# Patient Record
Sex: MALE | Race: OTHER | HISPANIC OR LATINO | Employment: FULL TIME | URBAN - METROPOLITAN AREA
[De-identification: names, ages, dates, MRNs, and addresses within clinical notes are randomized per-mention and may not be internally consistent; named-entity substitution may affect disease eponyms.]

---

## 2022-05-20 ENCOUNTER — HOSPITAL ENCOUNTER (EMERGENCY)
Facility: HOSPITAL | Age: 32
Discharge: HOME | End: 2022-05-21
Attending: EMERGENCY MEDICINE

## 2022-05-20 ENCOUNTER — APPOINTMENT (EMERGENCY)
Dept: RADIOLOGY | Facility: HOSPITAL | Age: 32
End: 2022-05-20

## 2022-05-20 ENCOUNTER — APPOINTMENT (EMERGENCY)
Dept: CT IMAGING | Facility: HOSPITAL | Age: 32
End: 2022-05-20

## 2022-05-20 DIAGNOSIS — S93.335A: Primary | ICD-10-CM

## 2022-05-20 PROCEDURE — 99285 EMERGENCY DEPT VISIT HI MDM: CPT | Performed by: EMERGENCY MEDICINE

## 2022-05-20 PROCEDURE — 99152 MOD SED SAME PHYS/QHP 5/>YRS: CPT | Performed by: EMERGENCY MEDICINE

## 2022-05-20 PROCEDURE — 96376 TX/PRO/DX INJ SAME DRUG ADON: CPT

## 2022-05-20 PROCEDURE — 96374 THER/PROPH/DIAG INJ IV PUSH: CPT

## 2022-05-20 PROCEDURE — 99285 EMERGENCY DEPT VISIT HI MDM: CPT

## 2022-05-20 PROCEDURE — 73590 X-RAY EXAM OF LOWER LEG: CPT

## 2022-05-20 PROCEDURE — 73700 CT LOWER EXTREMITY W/O DYE: CPT

## 2022-05-20 PROCEDURE — 73560 X-RAY EXAM OF KNEE 1 OR 2: CPT

## 2022-05-20 PROCEDURE — 73600 X-RAY EXAM OF ANKLE: CPT

## 2022-05-20 RX ORDER — GINSENG 100 MG
1 CAPSULE ORAL ONCE
Status: COMPLETED | OUTPATIENT
Start: 2022-05-20 | End: 2022-05-20

## 2022-05-20 RX ORDER — FENTANYL CITRATE 50 UG/ML
2 INJECTION, SOLUTION INTRAMUSCULAR; INTRAVENOUS ONCE
Status: COMPLETED | OUTPATIENT
Start: 2022-05-20 | End: 2022-05-20

## 2022-05-20 RX ORDER — PROPOFOL 10 MG/ML
1 INJECTION, EMULSION INTRAVENOUS ONCE
Status: COMPLETED | OUTPATIENT
Start: 2022-05-20 | End: 2022-05-21

## 2022-05-20 RX ORDER — HYDROMORPHONE HCL/PF 1 MG/ML
1 SYRINGE (ML) INJECTION ONCE
Status: COMPLETED | OUTPATIENT
Start: 2022-05-20 | End: 2022-05-20

## 2022-05-20 RX ADMIN — BACITRACIN ZINC 1 LARGE APPLICATION: 500 OINTMENT TOPICAL at 23:50

## 2022-05-20 RX ADMIN — HYDROMORPHONE HYDROCHLORIDE 1 MG: 1 INJECTION, SOLUTION INTRAMUSCULAR; INTRAVENOUS; SUBCUTANEOUS at 21:44

## 2022-05-20 RX ADMIN — HYDROMORPHONE HYDROCHLORIDE 1 MG: 1 INJECTION, SOLUTION INTRAMUSCULAR; INTRAVENOUS; SUBCUTANEOUS at 23:47

## 2022-05-20 NOTE — Clinical Note
Colton Nieto was seen and treated in our emergency department on 5/20/2022  No work until cleared by Family Doctor/Orthopedics        Diagnosis:     Vanita Land       He may return on this date: If you have any questions or concerns, please don't hesitate to call        Jamie Abbott RN    ______________________________           _______________          _______________  Hospital Representative                              Date                                Time

## 2022-05-21 ENCOUNTER — APPOINTMENT (EMERGENCY)
Dept: RADIOLOGY | Facility: HOSPITAL | Age: 32
End: 2022-05-21

## 2022-05-21 ENCOUNTER — APPOINTMENT (EMERGENCY)
Dept: CT IMAGING | Facility: HOSPITAL | Age: 32
End: 2022-05-21

## 2022-05-21 VITALS
HEART RATE: 76 BPM | OXYGEN SATURATION: 97 % | DIASTOLIC BLOOD PRESSURE: 72 MMHG | SYSTOLIC BLOOD PRESSURE: 116 MMHG | RESPIRATION RATE: 16 BRPM | TEMPERATURE: 98.7 F | WEIGHT: 315 LBS

## 2022-05-21 PROCEDURE — 73701 CT LOWER EXTREMITY W/DYE: CPT

## 2022-05-21 PROCEDURE — 73600 X-RAY EXAM OF ANKLE: CPT

## 2022-05-21 PROCEDURE — NC001 PR NO CHARGE: Performed by: PODIATRIST

## 2022-05-21 PROCEDURE — 73620 X-RAY EXAM OF FOOT: CPT

## 2022-05-21 RX ORDER — OXYCODONE HYDROCHLORIDE AND ACETAMINOPHEN 5; 325 MG/1; MG/1
1 TABLET ORAL EVERY 4 HOURS PRN
Qty: 20 TABLET | Refills: 0 | Status: SHIPPED | OUTPATIENT
Start: 2022-05-21

## 2022-05-21 RX ADMIN — PROPOFOL 100 MG: 10 INJECTION, EMULSION INTRAVENOUS at 00:24

## 2022-05-21 RX ADMIN — IOHEXOL 70 ML: 350 INJECTION, SOLUTION INTRAVENOUS at 01:33

## 2022-05-21 NOTE — ED PROVIDER NOTES
History  Chief Complaint   Patient presents with    Ankle Injury - Major     Patient arrives via EMS after jumping from an upper balcony to a lower balcony that was approximately 4 ft  Per EMS, visible deformity noted to right left ankle  Patient is a 79-year-old male  He was trying to get from a 3rd floor balcony to a 2nd floor balcony  He ended up falling  He fell about 4 ft  He injured his left ankle and abraded his left knee  He denies any other injuries  Denies striking his head  No neck or back pain  No associated motor or sensory complaints  The injury was sudden and occurred shortly prior to arrival   Patient presents by ambulance  Patient was splinted prior to arrival           None       History reviewed  No pertinent past medical history  History reviewed  No pertinent surgical history  History reviewed  No pertinent family history  I have reviewed and agree with the history as documented  E-Cigarette/Vaping     E-Cigarette/Vaping Substances     Social History     Tobacco Use    Smoking status: Current Every Day Smoker     Packs/day: 1 00     Types: Cigarettes    Smokeless tobacco: Never Used   Substance Use Topics    Alcohol use: Never    Drug use: Yes     Types: Marijuana       Review of Systems   Constitutional: Negative for chills and fever  HENT: Negative for rhinorrhea and sore throat  Eyes: Negative for pain, redness and visual disturbance  Respiratory: Negative for cough and shortness of breath  Cardiovascular: Negative for chest pain and leg swelling  Gastrointestinal: Negative for abdominal pain, diarrhea and vomiting  Endocrine: Negative for polydipsia and polyuria  Genitourinary: Negative for dysuria, frequency and hematuria  Musculoskeletal: Negative for back pain and neck pain  Skin: Negative for rash and wound  Allergic/Immunologic: Negative for immunocompromised state  Neurological: Negative for weakness, numbness and headaches  Psychiatric/Behavioral: Negative for hallucinations and suicidal ideas  All other systems reviewed and are negative  Physical Exam  Physical Exam  Vitals reviewed  Constitutional:       General: He is in acute distress  Appearance: He is obese  HENT:      Head: Normocephalic and atraumatic  Nose: Nose normal       Mouth/Throat:      Mouth: Mucous membranes are moist    Eyes:      Extraocular Movements: Extraocular movements intact  Pupils: Pupils are equal, round, and reactive to light  Neck:      Comments: No midline cervical tenderness  Cardiovascular:      Rate and Rhythm: Normal rate and regular rhythm  Pulses: Normal pulses  Heart sounds: Normal heart sounds  No murmur heard  No friction rub  No gallop  Pulmonary:      Effort: No respiratory distress  Breath sounds: Normal breath sounds  No stridor  No wheezing, rhonchi or rales  Chest:      Chest wall: No tenderness  Abdominal:      General: Bowel sounds are normal  There is no distension  Palpations: Abdomen is soft  Tenderness: There is no abdominal tenderness  There is no right CVA tenderness, left CVA tenderness, guarding or rebound  Musculoskeletal:         General: Tenderness, deformity and signs of injury present  Cervical back: Neck supple  No rigidity  Skin:     General: Skin is warm  Capillary Refill: Capillary refill takes less than 2 seconds  Neurological:      General: No focal deficit present  Mental Status: He is alert and oriented to person, place, and time  GCS: GCS eye subscore is 4  GCS verbal subscore is 5  GCS motor subscore is 6     Psychiatric:         Mood and Affect: Mood normal          Behavior: Behavior normal          Vital Signs  ED Triage Vitals [05/20/22 2053]   Temperature Pulse Respirations Blood Pressure SpO2   98 7 °F (37 1 °C) 89 18 158/96 95 %      Temp Source Heart Rate Source Patient Position - Orthostatic VS BP Location FiO2 (%) Oral Monitor Lying Right arm --      Pain Score       7           Vitals:    05/21/22 0039 05/21/22 0039 05/21/22 0042 05/21/22 0043   BP:  124/77  141/76   Pulse: 64 77 70    Patient Position - Orthostatic VS:             Visual Acuity      ED Medications  Medications   fentanyl citrate (PF) (FOR EMS ONLY) 100 mcg/2 mL injection 200 mcg (0 mcg Does not apply Given to EMS 5/20/22 2102)   HYDROmorphone (DILAUDID) injection 1 mg (1 mg Intravenous Given 5/20/22 2144)   propofol (DIPRIVAN) 200 MG/20ML bolus injection 143 mg (100 mg Intravenous Given by Other 5/21/22 0024)   bacitracin topical ointment 1 large application (1 large application Topical Given 5/20/22 2350)   HYDROmorphone (DILAUDID) injection 1 mg (1 mg Intravenous Given 5/20/22 2347)   iohexol (OMNIPAQUE) 350 MG/ML injection (MULTI-DOSE) 70 mL (70 mL Intravenous Given 5/21/22 0133)       Diagnostic Studies  Results Reviewed     None                 XR foot 2 views LEFT   ED Interpretation by Stefano Gaitan MD (05/21 0130)   Reduced  No fracture  XR ankle 2 vw left   ED Interpretation by Stefano Gaitan MD (05/21 0130)   Reduced  No fracture      XR tibia fibula 2 views LEFT   ED Interpretation by Stefano Gaitan MD (05/20 2341)   No tib fib fracture  XR ankle 2 vw left   ED Interpretation by Stefano Gaitan MD (05/20 2340)   There appears to be a subtalus joint dislocation  No fracture  XR knee 1 or 2 vw left   ED Interpretation by Stefano Gaitan MD (05/20 2341)   No knee fracture or dislocation        CT lower extremity wo contrast left    (Results Pending)   CT lower extremity w contrast left    (Results Pending)              Procedures  Pre-Procedural Sedation  Performed by: Stefano Gaitan MD  Authorized by: Stefano Gaitan MD     Consent:     Consent obtained:  Verbal    Consent given by:  Patient  Universal protocol:     Patient identity confirmation method:  Verbally with patient  Indications:     Sedation purpose: Fracture reduction    Procedure necessitating sedation performed by:  Different physician    Intended level of sedation:  Deep  Pre-sedation assessment:     ASA classification: class 1 - normal, healthy patient      Pre-sedation assessments completed and reviewed: airway patency, cardiovascular function, hydration status, mental status, nausea/vomiting, pain level, respiratory function and temperature      Pre-sedation assessment completed:  5/20/2022 9:25 PM  Procedural Sedation    Date/Time: 5/21/2022 1:00 AM  Performed by: Amado Neves MD  Authorized by: Amado Neves MD     Immediate pre-procedure details:     Reassessment: Patient reassessed immediately prior to procedure      Reviewed: vital signs, relevant labs/tests and NPO status      Verified: bag valve mask available, emergency equipment available, intubation equipment available, IV patency confirmed, oxygen available and suction available    Procedure details (see MAR for exact dosages):     Sedation start time:  5/21/2022 12:25 AM    Preoxygenation:  Nasal cannula    Sedation:  Propofol    Analgesia:  Hydromorphone    Intra-procedure monitoring:  Blood pressure monitoring, cardiac monitor, continuous pulse oximetry, continuous capnometry, frequent LOC assessments and frequent vital sign checks    Intra-procedure events: none      Sedation end time:  5/21/2022 1:00 AM    Total sedation time (minutes):  35  Post-procedure details:     Post-sedation assessment completed:  5/21/2022 1:04 AM    Attendance: Constant attendance by certified staff until patient recovered      Recovery: Patient returned to pre-procedure baseline      Post-sedation assessments completed and reviewed: airway patency, cardiovascular function, hydration status, mental status, nausea/vomiting, pain level and respiratory function      Patient is stable for discharge or admission: yes      Patient tolerance:   Tolerated well, no immediate complications      Conscious Sedation Assessment    Flowsheet Row Classification Score   ASA Scale Assessment 1-Healthy patient, no disease outside surgical process filed at 05/21/2022 0024             ED Course                               SBIRT 22yo+    Flowsheet Row Most Recent Value   SBIRT (25 yo +)    In order to provide better care to our patients, we are screening all of our patients for alcohol and drug use  Would it be okay to ask you these screening questions? No Filed at: 05/20/2022 8349                    [facility]  Number of Diagnoses or Management Options  Closed traumatic medial dislocation of subtalar joint, left, initial encounter  Diagnosis management comments: Apart from the knee abrasion in the left foot/ankle injury, history and physical exam did not suggest any other significant traumatic injuries  Plain films of the ankle suggested a subtalar dislocation  It was medial   Neurovascularly intact  It was closed  Consulted with Podiatry who came in and saw the patient  Podiatry reduced the dislocation while I performed procedural sedation  Repeat CT scan is pending  Disposition will be as per podiatry depending on CT scan results         Amount and/or Complexity of Data Reviewed  Tests in the radiology section of CPT®: ordered and reviewed  Discuss the patient with other providers: yes  Independent visualization of images, tracings, or specimens: yes        Disposition  Final diagnoses:   Closed traumatic medial dislocation of subtalar joint, left, initial encounter     Time reflects when diagnosis was documented in both MDM as applicable and the Disposition within this note     Time User Action Codes Description Comment    5/21/2022  1:06 AM Paty More Add [[id number] [id number]] Closed traumatic medial dislocation of subtalar joint, left, initial encounter       ED Disposition     None      Follow-up Information    None         Patient's Medications   Discharge Prescriptions    OXYCODONE-ACETAMINOPHEN (PERCOCET) 5-325 MG PER TABLET Take 1 tablet by mouth every 4 (four) hours as needed for severe pain Max Daily Amount: 6 tablets       Start Date: 5/21/2022 End Date: --       Order Dose: 1 tablet       Quantity: 20 tablet    Refills: 0       No discharge procedures on file      PDMP Review     None          ED Provider  Electronically Signed by           Mark Aguirre MD  05/21/22 9235

## 2022-05-21 NOTE — CONSULTS
Nancy Ontiveros MD        OFFICE  FOLLOWUP NOTE    Chief complaints: patient is here for management of  CAD,s/p CABG, DM, HTN, AFIB, DYSLPIDEMIA    Subjective: patient feels better, no chest pain, no shortness of breath, no dizziness, no palpitations    HPI Elva Jackson is a 63 y. o.year old who  has a past medical history of CAD (coronary artery disease), Diabetes mellitus ([address] H/O cardiovascular stress test, H/O echocardiogram, H/O echocardiogram, H/O exercise stress test, Herniated lumbar intervertebral disc, History of cardiac cath, History of exercise stress test, History of nuclear stress test, Hypertension, and Sleep apnea in adult. and presents for management of  CAD,s/p CABG, DM, HTN, AFIB, DYSLPIDEMIA which are well controlled    His lasix was increased to 40mg bid and in 2 months lost 12 lbs and his creatinine increased from 1.8 to 2.0, HE FEELS better,he was seen by nephrology and did not make any changes  Current Outpatient Medications   Medication Sig Dispense Refill    potassium chloride (KLOR-CON) 20 MEQ packet Take 20 mEq by mouth 2 times daily 60 each 6    acetaminophen (TYLENOL) 500 MG tablet Take 1,000 mg by mouth 2 times daily      Dulaglutide (TRULICITY) 1.5 SA/2.0EM SOPN Inject 1.5 mg into the skin once a week      traMADol (ULTRAM) 50 MG tablet Take 50 mg by mouth 3 times daily.        furosemide (LASIX) 40 MG tablet Take 1 tablet by mouth twice daily 90 tablet 3    dilTIAZem (CARDIZEM CD) 360 MG extended release capsule Take 1 capsule by mouth daily 90 capsule 3    glycopyrrolate-formoterol (BEVESPI AEROSPHERE) 9-4.8 MCG/ACT AERO Inhale 2 puffs into the lungs 2 times daily 1 Inhaler 5    hydroCHLOROthiazide (HYDRODIURIL) 25 MG tablet Take 1 tablet by mouth once daily 90 tablet 3    prasugrel (EFFIENT) 10 MG TABS Take 1 tablet by mouth daily 90 tablet 3    carvedilol (COREG) 12.5 MG tablet Take 1 tablet by mouth 2 times daily (with meals) 180 tablet 3    apixaban Podiatry - Consultation    Patient Information:   Ailyn Crawford 36 y o  male MRN: [id number]  Unit/Bed#: ED 10 Encounter: [id number]  PCP: No primary care provider on file  Date of Admission:  5/20/2022  Date of Consultation: 05/21/22  Requesting Physician: Mirna Rosario MD      ASSESSMENT:    Ailyn Crawford is a 36 y o  male with:    1  Left foot STJ dislocation  2  Skin abrasion left knee    PLAN:    · Under sedation provided per ED, the STJ dislocation was reduced  PT and medial plantar artery continued to be dopplerble after reduction  DT artery was noted to be palpable  A AO splint was then applied after ample Webril to protect all bryon prominence  · Post reduction CT w contrast was ordered to rule out any neurovascular incarceration  F/u CT results prior to d/c  If neurovascular is intact and in place, pt can be d/c to f/u out pt with Podiatry  · Post reduction xrays were reviewed:STJ joint was noted to be well reduced in anatomical alignment  · Pre reduction Xray and CT scan were reviewed: posterior medial STJ dislocation was noted  · Elevation and offloading on green foam wedges or pillows when non-ambulatory  · Rest of care per primary team   · Plan was discussed with my attending  Weightbearing status: Strict NWB RLE    SUBJECTIVE:    History of Present Illness:    Ailyn Crawford is a 36 y o  male who is originally seen in the ED 5/20/2022 due to left foot injury  Patient has a no known PMH  Pt has a hx of smoking 1 pack a day  We are consulted for right subtalar joint dislocation  Pt reports that he was trying to get from a 3rd flood balcony to a 2nd floor balcony when he fell  Pt denies hitting his head  Pt reports pain to LLE  Pt denies pain to RLE  Pt denies paresthesia to left lower extremity  Pt denies nausea, vomiting, chills, SOD, chest pain  Review of Systems:    Constitutional: Negative  HENT: Negative  Eyes: Negative  Respiratory: Negative      Cardiovascular: (ELIQUIS) 5 MG TABS tablet Take 1 tablet by mouth 2 times daily 180 tablet 3    lisinopril (PRINIVIL;ZESTRIL) 10 MG tablet Take 1 tablet by mouth daily 90 tablet 3    methocarbamol (ROBAXIN) 500 MG tablet Take 750 mg by mouth 2 times daily       nitroGLYCERIN (NITROSTAT) 0.4 MG SL tablet Place 1 tablet under the tongue every 5 minutes as needed for Chest pain 25 tablet 3    busPIRone (BUSPAR) 7.5 MG tablet 1 tablet as needed       traZODone (DESYREL) 50 MG tablet Take 100 mg by mouth nightly       metFORMIN (GLUCOPHAGE-XR) 500 MG extended release tablet TAKE 2 TABLETS BY MOUTH TWICE DAILY  5    rosuvastatin (CRESTOR) 20 MG tablet TAKE 1 TABLET BY MOUTH ONCE DAILY AT BEDTIME  5    VENTOLIN  (90 Base) MCG/ACT inhaler INHALE 2 PUFFS BY MOUTH EVERY 4 TO 6 HOURS AS NEEDED  5    gabapentin (NEURONTIN) 600 MG tablet Take 600 mg by mouth 3 times daily. No current facility-administered medications for this visit. Allergies: Patient has no known allergies. Past Medical History:   Diagnosis Date    CAD (coronary artery disease)     s/p CABG, sees Dr. Regina Layne Diabetes mellitus ([facility] [address]     H/O cardiovascular stress test 05/16/2019; 6/30/2020    abnormal stress test, LVEF 40%, Myocardial perfusion scan shows moderate size, severe intensity, non reversible perfusion defect in inferoapical wall.  H/O echocardiogram 05/06/2019    Limited study-Left ventricular function is low normal. EF 50-55% Mild left ventricular hypertrophy.  H/O echocardiogram 03/04/2021    Left ventricular function is mildly abnormal, estimated EF 40-45%. Mild MR & TR.    H/O exercise stress test 12/19/2019    Submaximal ECG stress test. stress test stopped due to Shortness of breath.  Proceed with cardiac rehab    Herniated lumbar intervertebral disc     History of cardiac cath 07/31/2020    PATENT 2/3 GRAFTS, SEVERE NATIVE RCA stenosis at mentioned above, required PCI with YAMILE as mentioned    History of exercise stress Negative  Gastrointestinal: Negative  Musculoskeletal: pain left foot, stj dislocation   Skin:skin abrasion left knee   Neurological: negative  Psych: Negative  Past Medical and Surgical History:     History reviewed  No pertinent past medical history  History reviewed  No pertinent surgical history  Meds/Allergies:    (Not in a hospital admission)      No Known Allergies    Social History:     Marital Status: Single    Substance Use History:   Social History     Substance and Sexual Activity   Alcohol Use Never     Social History     Tobacco Use   Smoking Status Current Every Day Smoker    Packs/day: 1 00    Types: Cigarettes   Smokeless Tobacco Never Used     Social History     Substance and Sexual Activity   Drug Use Yes    Types: Marijuana       Family History:    History reviewed  No pertinent family history  OBJECTIVE:    Vitals:   Blood Pressure: 141/76 (05/21/22 0043)  Pulse: 70 (05/21/22 0042)  Temperature: 98 7 °F (37 1 °C) (05/20/22 2053)  Temp Source: Oral (05/20/22 2053)  Respirations: 16 (05/21/22 0039)  Weight - Scale: (!) 143 kg (315 lb 11 2 oz) (05/20/22 2143)  SpO2: 100 % (05/21/22 0042)    Physical Exam:    General Appearance: Alert, cooperative, no distress  HEENT: Head normocephalic, atraumatic, without obvious abnormality  Heart: Normal rate and rhythm  Lungs: Non-labored breathing  No respiratory distress  Abdomen: Without distension  Psychiatric: AAOx3  Lower Extremity:    Vascular: (Vascular examination bellow applies to before and after reduction)  DP: Right: 2+ Left: 2+  PT: Right: 2+ Left: doppler signal   Medial plantar artery was dopplerble to LLE  CRT < 2 seconds at the digits  +2/4 edema noted at left lower extremities  Skin temperature is WNL bilaterally  Musculoskeletal:  MMT is 5/5 in all muscle compartments RLE, MMT not tested to LLE     Pain on palpation of right rearfoot     Dermatological:  Skin abrasion noted at the level of left knee limited to breakdown of skin    No skin tenting noted at this time at the level of the dislocation  No fracture blisters noted, mild echymosis noted to left lateral rearfoot  Neurological:  Gross sensation is intact  Light touch is intact  Clinical Images 05/21/22: Additional data:     Lab Results: I have personally reviewed pertinent labs including:              Invalid input(s): LABALBU        Cultures: I have personally reviewed pertinent cultures including:              Imaging: I have personally reviewed pertinent reports in PACS  EKG, Pathology, and Other Studies: I have personally reviewed pertinent reports  Time Spent for Care: 30 minutes  More than 50% of total time spent on counseling and coordination of care as described above  ** Please Note: Portions of the record may have been created with voice recognition software  Occasional wrong word or "sound a like" substitutions may have occurred due to the inherent limitations of voice recognition software  Read the chart carefully and recognize, using context, where substitutions have occurred   ** test 2020    Treadmill, normal stress test, THR achieved    History of nuclear stress test 2020     Scan shows moderatel size, severe intensity, non reversible perfusion defect.      Hypertension     Sleep apnea in adult 2019     Past Surgical History:   Procedure Laterality Date    CIRCUMCISION, NON-  2013    CORONARY ARTERY BYPASS GRAFT MAZE PROCEDURE N/A 2019    CABG CORONARY ARTERY BYPASS GRAFT X3, MAZE PROCEDURE, INTRAOPERATIVE WALDEMAR, INDUCED HYPOTHERMIA, ENDOSCOPIC VEIN HARVEST OF THE LEFT LEG performed by Jose Bone MD at [place] [address] Right     Transposition of Right Ulnar Nerve     Family History   Problem Relation Age of Onset   [facility] Hypertension Mother     Hypertension Father     Hypertension Sister     Hypertension Brother     Cancer Maternal Grandmother      Social History     Tobacco Use    Smoking status: Former Smoker     Packs/day: 0.25     Years: 30.00     Pack years: 7.50     Types: Cigarettes    Smokeless tobacco: Never Used   Substance Use Topics    Alcohol use: No     Alcohol/week: 0.0 standard drinks      [unfilled]  Review of Systems:   · Constitutional: No Fever or Weight Loss   · Eyes: No Decreased Vision  · ENT: No Headaches, Hearing Loss or Vertigo  · Cardiovascular: No chest pain, dyspnea on exertion, palpitations or loss of consciousness  · Respiratory: No cough or wheezing    · Gastrointestinal: No abdominal pain, appetite loss, blood in stools, constipation, diarrhea or heartburn  · Genitourinary: No dysuria, trouble voiding, or hematuria  · Musculoskeletal:  No gait disturbance, weakness or joint complaints  · Integumentary: No rash or pruritis  · Neurological: No TIA or stroke symptoms  · Psychiatric: No anxiety or depression  · Endocrine: No malaise, fatigue or temperature intolerance  · Hematologic/Lymphatic: No bleeding problems, blood clots or swollen lymph nodes  · Allergic/Immunologic: No nasal congestion or hives  All systems negative except as marked. Objective:  /88   Pulse 96   Ht 5' 11\" (1.803 m)   Wt 235 lb 6.4 oz (106.8 kg)   BMI 32.83 kg/m²   Wt Readings from Last 3 Encounters:   05/26/21 235 lb 6.4 oz (106.8 kg)   05/21/21 230 lb 3.2 oz (104.4 kg)   04/28/21 238 lb (108 kg)     Body mass index is 32.83 kg/m². GENERAL - Alert, oriented, pleasant, in no apparent distress,normal grooming  HEENT  pupils are intact, cornea intact, conjunctive normal color, ears are normal in exam,  Neck - Supple. No jugular venous distention noted. No carotid bruits, no apical -carotid delay  Respiratory:  Normal breath sounds, No respiratory distress, No wheezing, No chest tenderness. ,no use of accessory muscles, diaphragm movement is normal  Cardiovascular: (PMI) apex non displaced,no lifts no thrills, no s3,no s4, Normal heart rate, Normal rhythm, No murmurs, No rubs, No gallops. Carotid arteries pulse and amplitude are normal no bruit, no abdominal bruit noted ( normal abdominal aorta ausculation),   Extremities - No cyanosis, clubbing, or significant edema, no varicose veins    Abdomen  No masses, tenderness, or organomegaly, no hepato-splenomegally, no bruits  Musculoskeletal  No significant edema, no kyphosis or scoliosis, no deformity in any extremity noted, muscle strength and tone are normal  Skin: no ulcer,no scar,no stasis dermatitis   Neurologic  alert oriented times 3,Cranial nerves II through XII are grossly intact. There were no gross focal neurologic abnormalities.    Psychiatric: normal mood and affect    No results found for: CKTOTAL, CKMB, CKMBINDEX, TROPONINI  BNP:  No results found for: BNP  PT/INR:  No results found for: PTINR  Lab Results   Component Value Date    LABA1C 6.6 (H) 11/20/2019    LABA1C 7.1 (H) 05/04/2019     Lab Results   Component Value Date    CHOL 137 05/04/2020    TRIG 147 05/04/2020    HDL 57 05/04/2020    LDLCALC 51 05/04/2020    LDLDIRECT 56 05/03/2019     Lab Results

## 2022-05-21 NOTE — DISCHARGE INSTRUCTIONS
Discharge Instructions - Podiatry    Weight Bearing Status: Strict Non-weight bearing left lower extremity with crutches              Follow-up appointment instructions: Please make an appointment within one week of discharge with Dr Sathya Rodríguez  Contact sooner if any increase in pain, or signs of infection occur    Leave dressings clean, dry, and intact    Elevate Left lower extremity with 3- 4 pillows    Apply Ice pack behind the knee for 20 min 4-5 times a day    Please return to the ED for evaluation if the pain increases significantly and does not reduce with pain medication, lost of sensation to toes, inability to move the toes, decrease in temperature of left extremity

## 2022-05-24 ENCOUNTER — OFFICE VISIT (OUTPATIENT)
Dept: PODIATRY | Facility: CLINIC | Age: 32
End: 2022-05-24

## 2022-05-24 VITALS — HEIGHT: 66 IN | WEIGHT: 315 LBS | BODY MASS INDEX: 50.62 KG/M2

## 2022-05-24 DIAGNOSIS — M79.672 PAIN IN LEFT FOOT: Primary | ICD-10-CM

## 2022-05-24 DIAGNOSIS — S93.315A CLOSED TRAUMATIC DISLOCATION OF LEFT SUBTALAR JOINT, INITIAL ENCOUNTER: ICD-10-CM

## 2022-05-24 PROCEDURE — 99204 OFFICE O/P NEW MOD 45 MIN: CPT | Performed by: PODIATRIST

## 2022-05-24 RX ORDER — MELOXICAM 15 MG/1
15 TABLET ORAL DAILY
Qty: 30 TABLET | Refills: 1 | Status: SHIPPED | OUTPATIENT
Start: 2022-05-24

## 2022-05-24 NOTE — PROGRESS NOTES
HISTORY AND PHYSICAL EXAM  - [facility]    PATIENT:  Woody Braun    1990      Assessment/Plan     Problem List Items Addressed This Visit    None     Visit Diagnoses     Pain in left foot    -  Primary    Relevant Medications    meloxicam (Mobic) 15 mg tablet    Closed traumatic dislocation of left subtalar joint, initial encounter        Relevant Medications    meloxicam (Mobic) 15 mg tablet           Diagnoses and all orders for this visit:    Pain in left foot  -     meloxicam (Mobic) 15 mg tablet; Take 1 tablet (15 mg total) by mouth in the morning  Closed traumatic dislocation of left subtalar joint, initial encounter  -     meloxicam (Mobic) 15 mg tablet; Take 1 tablet (15 mg total) by mouth in the morning      -advised rice, patient is strict nonweightbearing for the next 6 weeks  Previous x-rays and notes from the ER were reviewed, and show good relocation of the left STJ  -he will need repeat x-rays in 6 weeks to assure maintain alignment   -in 6 weeks we will hopefully transition to weight-bearing as tolerated in Cam boot  Patient is obese and we discussed subtalar instability  If after transition to weight-bearing he has significant pain instability, he will likely need left STJ fusion  -Rx given for Mobic for pain  -we placed him and his AO splint again on this visit, advised to purchase Cam boot online and he was directed to where to purchase this, once this arrives he is to remove the splint in place himself in the cam boot  If there are any issues here is return to clinic sooner    History of Present Illness   Woody Braun is a 36 y o  male who presents with a left subtalar dislocation status post a fall and a balcony approximately 4 ft  He was seen in the [facility] emergency room and had this relocated by Podiatry  He was placed in an AO splint and has been nonweightbearing on this  His pain is improving with use of pain medication      Review of Systems Constitutional: Negative for chills and fever  HENT: Negative for ear pain and sore throat  Eyes: Negative for pain and visual disturbance  Respiratory: Negative for cough and shortness of breath  Cardiovascular: Negative for chest pain and palpitations  Gastrointestinal: Negative for abdominal pain and vomiting  Genitourinary: Negative for dysuria and hematuria  Musculoskeletal: Negative for arthralgias and back pain  Skin: Negative for color change and rash  Neurological: Negative for seizures and syncope  All other systems reviewed and are negative  Historical Information   History reviewed  No pertinent past medical history  History reviewed  No pertinent surgical history  Social History   Social History     Substance and Sexual Activity   Alcohol Use Never     Social History     Substance and Sexual Activity   Drug Use Yes    Types: Marijuana     Social History     Tobacco Use   Smoking Status Current Every Day Smoker    Packs/day: 1 00    Types: Cigarettes   Smokeless Tobacco Never Used     Family History: non-contributory    Meds/Allergies   all medications and allergies reviewed  No Known Allergies    Objective   Vitals: Height 5' 6" (1 676 m), weight (!) 143 kg (315 lb)  ,Body mass index is 50 84 kg/m²  Physical Exam  Vitals reviewed  Constitutional:       Appearance: Normal appearance  He is obese  HENT:      Head: Normocephalic and atraumatic  Nose: Nose normal       Mouth/Throat:      Mouth: Mucous membranes are moist    Eyes:      Pupils: Pupils are equal, round, and reactive to light  Cardiovascular:      Pulses: Normal pulses  Pulmonary:      Effort: Pulmonary effort is normal    Musculoskeletal:         General: Swelling, tenderness, deformity and signs of injury present  Comments:  There is significant tenderness to palpation of the left foot, there is significant ecchymosis and the rearfoot, there is minimal calcaneal tenderness, there is tenderness along the left STJ  No open lesions  Skin:     General: Skin is warm  Capillary Refill: Capillary refill takes 2 to 3 seconds  Neurological:      General: No focal deficit present  Mental Status: He is alert and oriented to person, place, and time           Ortho Exam

## 2022-05-24 NOTE — LETTER
May 26, 2022     Patient: Ken Cuellar  YOB: 1990  Date of Visit: 5/24/2022      To Whom it May Concern:    Ken Cuellar is under my professional care  Abel Garcia was seen in my office on 5/24/2022  Abel Garcia may return to work with limitations Sedentary duty only, he is strict nonweightbearing to the left foot for 6 weeks until reassessed by physician  If you have any questions or concerns, please don't hesitate to call           Sincerely,          Rosanna Verduzco DPM        CC: No Recipients

## 2022-08-09 ENCOUNTER — TELEPHONE (OUTPATIENT)
Dept: OBGYN CLINIC | Facility: HOSPITAL | Age: 32
End: 2022-08-09

## 2022-08-09 NOTE — TELEPHONE ENCOUNTER
Patient called asking to schedule with [facility]'s Podiatry  Provided number and warm transfer to [place] [id number] [facility]

## 2022-08-27 ENCOUNTER — HOSPITAL ENCOUNTER (EMERGENCY)
Facility: HOSPITAL | Age: 32
Discharge: HOME | End: 2022-08-27
Attending: EMERGENCY MEDICINE | Admitting: EMERGENCY MEDICINE

## 2022-08-27 VITALS
TEMPERATURE: 98.1 F | WEIGHT: 315 LBS | BODY MASS INDEX: 50.99 KG/M2 | DIASTOLIC BLOOD PRESSURE: 103 MMHG | OXYGEN SATURATION: 98 % | SYSTOLIC BLOOD PRESSURE: 164 MMHG | HEART RATE: 63 BPM | RESPIRATION RATE: 20 BRPM

## 2022-08-27 DIAGNOSIS — K04.7 DENTAL INFECTION: Primary | ICD-10-CM

## 2022-08-27 PROCEDURE — 99284 EMERGENCY DEPT VISIT MOD MDM: CPT | Performed by: PHYSICIAN ASSISTANT

## 2022-08-27 PROCEDURE — 99282 EMERGENCY DEPT VISIT SF MDM: CPT

## 2022-08-27 RX ORDER — ACETAMINOPHEN 500 MG
500 TABLET ORAL EVERY 6 HOURS PRN
Qty: 30 TABLET | Refills: 0 | Status: SHIPPED | OUTPATIENT
Start: 2022-08-27

## 2022-08-27 RX ORDER — NAPROXEN 500 MG/1
500 TABLET ORAL 2 TIMES DAILY WITH MEALS
Qty: 20 TABLET | Refills: 0 | Status: SHIPPED | OUTPATIENT
Start: 2022-08-27 | End: 2023-08-27

## 2022-08-27 RX ORDER — CHLORHEXIDINE GLUCONATE 0.12 MG/ML
15 RINSE ORAL 2 TIMES DAILY
Qty: 120 ML | Refills: 0 | Status: SHIPPED | OUTPATIENT
Start: 2022-08-27

## 2022-08-27 RX ORDER — AMOXICILLIN 500 MG/1
500 CAPSULE ORAL EVERY 12 HOURS SCHEDULED
Qty: 20 CAPSULE | Refills: 0 | Status: SHIPPED | OUTPATIENT
Start: 2022-08-27 | End: 2022-09-06

## 2022-08-27 NOTE — ED PROVIDER NOTES
History  Chief Complaint   Patient presents with    Dental Pain     Patient reports left lower facial pain and swelling, appt with dentist on Monday  Patient is a 82-year-old presents today for evaluation of left-sided lower jaw dental pain ongoing for the past 2 days  Patient denies fevers, chills, purulent drainage, facial swelling, difficulty swelling or difficulty managing oral secretions  Patient reports he does not see a dentist on a regular basis however called to make an appointment on Monday  Patient reports chronic dental problems  Patient denies any relief with ibuprofen and Orajel at home      History provided by:  Patient   used: No    Dental Pain  Location:  Lower  Lower teeth location:  18/LL 2nd molar  Quality:  Aching  Severity:  Moderate  Onset quality:  Gradual  Timing:  Constant  Progression:  Unchanged  Chronicity:  Recurrent  Context: crown fracture, dental caries and dental fracture    Relieved by:  None tried  Worsened by:  Nothing  Ineffective treatments:  None tried  Associated symptoms: no congestion and no fever        None       History reviewed  No pertinent past medical history  History reviewed  No pertinent surgical history  History reviewed  No pertinent family history  I have reviewed and agree with the history as documented  E-Cigarette/Vaping     E-Cigarette/Vaping Substances    Nicotine Yes      Social History     Tobacco Use    Smoking status: Current Every Day Smoker     Packs/day: 1 00     Types: Cigarettes    Smokeless tobacco: Never Used   Substance Use Topics    Alcohol use: Not Currently    Drug use: Yes     Types: Marijuana       Review of Systems   Constitutional: Negative for chills, fatigue and fever  HENT: Positive for dental problem  Negative for congestion, ear pain, rhinorrhea and sore throat  Eyes: Negative for redness  Respiratory: Negative for chest tightness and shortness of breath      Cardiovascular: Negative for chest pain and palpitations  Gastrointestinal: Negative for abdominal pain, nausea and vomiting  Genitourinary: Negative for dysuria and hematuria  Musculoskeletal: Negative  Skin: Negative for rash  Neurological: Negative for dizziness, syncope, light-headedness and numbness  Physical Exam  Physical Exam  Vitals and nursing note reviewed  Constitutional:       Appearance: Normal appearance  He is well-developed  HENT:      Head: Normocephalic and atraumatic  Mouth/Throat:     Eyes:      General: No scleral icterus  Pupils: Pupils are equal, round, and reactive to light  Cardiovascular:      Rate and Rhythm: Normal rate and regular rhythm  Pulses: Normal pulses  Pulmonary:      Effort: Pulmonary effort is normal  No respiratory distress  Breath sounds: No stridor  Abdominal:      General: There is no distension  Palpations: There is no mass  Musculoskeletal:      Cervical back: Normal range of motion  Skin:     General: Skin is warm and dry  Capillary Refill: Capillary refill takes less than 2 seconds  Coloration: Skin is not jaundiced  Neurological:      Mental Status: He is alert and oriented to person, place, and time        Gait: Gait normal    Psychiatric:         Mood and Affect: Mood normal          Vital Signs  ED Triage Vitals [08/27/22 0948]   Temperature Pulse Respirations Blood Pressure SpO2   98 1 °F (36 7 °C) 63 20 (!) 164/103 98 %      Temp Source Heart Rate Source Patient Position - Orthostatic VS BP Location FiO2 (%)   Oral Monitor Sitting Left arm --      Pain Score       10 - Worst Possible Pain           Vitals:    08/27/22 0948   BP: (!) 164/103   Pulse: 63   Patient Position - Orthostatic VS: Sitting         Visual Acuity      ED Medications  Medications - No data to display    Diagnostic Studies  Results Reviewed     None                 No orders to display              Procedures  Procedures         ED Course MDM  Number of Diagnoses or Management Options  Dental infection  Diagnosis management comments: All imaging and/or lab testing discussed with patient, strict return to ED precautions discussed  Patient recommended to follow up promptly with appropriate outpatient provider  Patient and/or family members verbalizes understanding and agrees with plan  Patient is stable for discharge      Portions of the record may have been created with voice recognition software  Occasional wrong word or "sound a like" substitutions may have occurred due to the inherent limitations of voice recognition software  Read the chart carefully and recognize, using context, where substitutions have occurred  Disposition  Final diagnoses:   Dental infection     Time reflects when diagnosis was documented in both MDM as applicable and the Disposition within this note     Time User Action Codes Description Comment    8/27/2022  9:54 AM Jairo Moreno [K04 7] Dental infection       ED Disposition     ED Disposition   Discharge    Condition   Good    Date/Time   Sat Aug 27, 2022  9:54 AM    Comment   Rene Courtney discharge to home/self care                 Follow-up Information     Follow up With Specialties Details Why [address]  Schedule an appointment as soon as possible for a visit in 2 days  [address] [facility]          Discharge Medication List as of 8/27/2022  9:56 AM      START taking these medications    Details   acetaminophen (TYLENOL) 500 mg tablet Take 1 tablet (500 mg total) by mouth every 6 (six) hours as needed (pain), Starting Sat 8/27/2022, Normal      amoxicillin (AMOXIL) 500 mg capsule Take 1 capsule (500 mg total) by mouth every 12 (twelve) hours for 10 days, Starting Sat 8/27/2022, Until Tue 9/6/2022, Normal      chlorhexidine (PERIDEX) 0 12 % solution Apply 15 mL to the mouth or throat 2 (two) times a day, Starting Sat 8/27/2022, Normal      naproxen (EC NAPROSYN) 500 MG EC tablet Take 1 tablet (500 mg total) by mouth 2 (two) times a day with meals, Starting Sat 8/27/2022, Until Sun 8/27/2023, Normal         CONTINUE these medications which have NOT CHANGED    Details   meloxicam (Mobic) 15 mg tablet Take 1 tablet (15 mg total) by mouth in the morning , Starting Tue 5/24/2022, Normal      oxyCODONE-acetaminophen (Percocet) 5-325 mg per tablet Take 1 tablet by mouth every 4 (four) hours as needed for severe pain Max Daily Amount: 6 tablets, Starting Sat 5/21/2022, Normal             No discharge procedures on file      PDMP Review     None          ED Provider  Electronically Signed by           Luisito Boudreaux PA-C  08/27/22 5557